# Patient Record
Sex: FEMALE | Race: BLACK OR AFRICAN AMERICAN | URBAN - METROPOLITAN AREA
[De-identification: names, ages, dates, MRNs, and addresses within clinical notes are randomized per-mention and may not be internally consistent; named-entity substitution may affect disease eponyms.]

---

## 2018-06-18 ENCOUNTER — HOSPITAL ENCOUNTER (OUTPATIENT)
Dept: MEDSURG UNIT | Facility: HOSPITAL | Age: 18
End: 2018-06-19
Attending: INTERNAL MEDICINE | Admitting: EMERGENCY MEDICINE

## 2018-06-24 LAB
FINAL CULTURE: NORMAL
FINAL CULTURE: NORMAL

## 2022-04-09 ENCOUNTER — HISTORICAL (OUTPATIENT)
Dept: ADMINISTRATIVE | Facility: HOSPITAL | Age: 22
End: 2022-04-09

## 2022-04-25 VITALS
SYSTOLIC BLOOD PRESSURE: 109 MMHG | BODY MASS INDEX: 40.62 KG/M2 | WEIGHT: 243.81 LBS | DIASTOLIC BLOOD PRESSURE: 73 MMHG | HEIGHT: 65 IN

## 2022-04-30 NOTE — DISCHARGE SUMMARY
"   Patient:   Dacia Worley            MRN: [id number]            FIN: [id number]               Age:   18 years     Sex:  Female     :  2000   Associated Diagnoses:   None   Author:   Sean Calderon MD        Discharge summary:   19 yo AAF with no significant PMH presented to the ED with the complain of right flank pain since 2 days. Pt describes the pain as "someone poking  her in the back". DEscribes it as constant, moderate in intensity, no rdaiation, no aggravating and relieving factors. Pt states that she did take tylenol at home but it did not help her. Pt also reports nausea but has not been vomiting however states that sh did not eat anything today because  of the nausea. Pt states that yesterday she started o run fever however she did not measure the temperature at home. Pt denies any urinary symptoms including burning, change  in smell, colour or blood in urine, incresaed frequency or urgency. Pt denies any vaginal discharge. Reports she is sexually active currently with one male partner, they donot use precaution. Denies any discharge or symptoms in the partner. Pt reports that she was also diagnosed with UTI 4 months ago and took abx for 7 days, she does not recall the name of the abx. Pt denies chills, night sweats, chest pain, headache, shortness of breath, chang anywhee else in the bosy. On arrival to the ER, vitals were temp 39.2, , RR 18, satting 99% on RA, /81. Labs revealed a WBC count of 11.3, urinalysis revealed UTI (however a bad catch), GC neg. Pt was given 1 dose of Rocephin in the ER and 3.5 litres of NS in the ER. Med on call was consulted for admission. Of not urine culture from 2018 grew E coli.    Today: Patient examined at bedside and NAD. She states she is feeling better then when she came in. The pain in her right flank is still there but has lessened. Her CT read showing right sided pyelonephritis. Patient denies any other complaints overnight, " no chest pain, no SOB, no vomiting. She expressed some mild nausea but otherwise she states she is in good health.     General_appears own age, in no acute distress  Integumentary_ normal capillary refill, normal texture/turgor/moisture  Eye_PERRLA, no icterus, no conjunctival injection  HENT_normocephalic, atraumatic, moist mucous membranes, no erythema, no exudate  Respiratory_clear to auscultation, no wheezes  Cardiovascular_RRR, no murmurs/rubs/gallops, normal distal pulses  Gastrointestinal_normal bowel sounds, abdomen soft/non-tender, no distension, +mild CVA tenderness on right improved from admission  Musculoskeletal_normal ROM, bilateral strength equal     Date of admission:  6/18/18    Date of discharge:  6/19/18    Presenting complaint: Right flank pain, fever, chills    Positive physical findings: Right CVA tenderness    Major test results:   CT abd/pelvis: Right sided pyelonephritis  Labs Last 48 hours   Chemistry  Hematology/Coagulation    Sodium Lvl: 142 mmol/L (06/19/18 07:25:00 CDT) WBC: 8.7 x10(3)/mcL (06/19/18 05:08:00 CDT)   Sodium Lvl: 136 mmol/L (06/18/18 16:04:00 CDT) WBC: 11.3 x10(3)/mcL High (06/18/18 16:04:00 CDT)   Potassium Lvl: 3.4 mmol/L Low (06/19/18 07:25:00 CDT) RBC: 3.85 x10(6)/mcL Low (06/19/18 05:08:00 CDT)   Potassium Lvl: 3.5 mmol/L (06/18/18 16:04:00 CDT) RBC: 4.68 x10(6)/mcL (06/18/18 16:04:00 CDT)   Chloride: 111 mmol/L High (06/19/18 07:25:00 CDT) Hgb: 10.6 gm/dL Low (06/19/18 05:08:00 CDT)   Chloride: 104 mmol/L (06/18/18 16:04:00 CDT) Hgb: 13 gm/dL (06/18/18 16:04:00 CDT)   CO2: 23 mmol/L (06/19/18 07:25:00 CDT) Hct: 33.8 % Low (06/19/18 05:08:00 CDT)   CO2: 23 mmol/L (06/18/18 16:04:00 CDT) Hct: 39.2 % (06/18/18 16:04:00 CDT)   Calcium Lvl: 7.7 mg/dL Low (06/19/18 07:25:00 CDT) Platelet: 177 x10(3)/mcL (06/19/18 05:08:00 CDT)   Calcium Lvl: 9.1 mg/dL (06/18/18 16:04:00 CDT) Platelet: 235 x10(3)/mcL (06/18/18 16:04:00 CDT)   Glucose Lvl: 88 mg/dL (06/19/18 07:25:00 CDT)  MCV: 87.8 fL (06/19/18 05:08:00 CDT)   Glucose Lvl: 91 mg/dL (06/18/18 16:04:00 CDT) MCV: 83.8 fL (06/18/18 16:04:00 CDT)   BUN: 7 mg/dL (06/19/18 07:25:00 CDT) MCH: 27.5 pg (06/19/18 05:08:00 CDT)   BUN: 10 mg/dL (06/18/18 16:04:00 CDT) MCH: 27.8 pg (06/18/18 16:04:00 CDT)   Creatinine: 0.7 mg/dL (06/19/18 07:25:00 CDT) MCHC: 31.4 gm/dL (06/19/18 05:08:00 CDT)   Creatinine: 0.9 mg/dL (06/18/18 16:04:00 CDT) MCHC: 33.2 gm/dL (06/18/18 16:04:00 CDT)   eGFR-AA: >105 (06/19/18 07:25:00 CDT) RDW: 13.4 % (06/19/18 05:08:00 CDT)   eGFR-AA: 103 mL/min (06/18/18 16:04:00 CDT) RDW: 13.3 % (06/18/18 16:04:00 CDT)   eGFR-MYRNA: >105 (06/19/18 07:25:00 CDT) MPV: 10.7 fL High (06/19/18 05:08:00 CDT)   eGFR-MYRNA: 85 mL/min Low (06/18/18 16:04:00 CDT) MPV: 9.6 fL (06/18/18 16:04:00 CDT)   Bili Total: 2.3 mg/dL High (06/19/18 07:25:00 CDT) Abs Neut: 5.64 x10(3)/mcL (06/19/18 05:08:00 CDT)   Bili Total: 2.6 mg/dL High (06/18/18 16:04:00 CDT) Abs Neut: 9.06 x10(3)/mcL (06/18/18 16:04:00 CDT)   Bili Direct: 0.7 mg/dL High (06/19/18 07:25:00 CDT) Neutro Auto: 65 x10(3)/mcL (06/19/18 05:08:00 CDT)   Bili Direct: 0.6 mg/dL High (06/18/18 16:04:00 CDT) Neutro Auto: 80 x10(3)/mcL (06/18/18 16:04:00 CDT)   Bili Indirect: 1.6 mg/dL High (06/19/18 07:25:00 CDT) Lymph Auto: 18 % (06/19/18 05:08:00 CDT)   Bili Indirect: 2 mg/dL High (06/18/18 16:04:00 CDT) Lymph Auto: 6 % Low (06/18/18 16:04:00 CDT)   AST: 17 unit/L (06/19/18 07:25:00 CDT) Mono Auto: 16 % High (06/19/18 05:08:00 CDT)   AST: 18 unit/L (06/18/18 16:04:00 CDT) Mono Auto: 13 % High (06/18/18 16:04:00 CDT)   ALT: 22 unit/L (06/19/18 07:25:00 CDT) Eos Auto: 0 (06/19/18 05:08:00 CDT)   ALT: 27 unit/L (06/18/18 16:04:00 CDT) Eos Auto: 0 (06/18/18 16:04:00 CDT)   Alk Phos: 69 unit/L (06/19/18 07:25:00 CDT) Abs Eos: 0.03 x10(3)/mcL (06/19/18 05:08:00 CDT)   Alk Phos: 79 unit/L (06/18/18 16:04:00 CDT) Abs Eos: 0.01 x10(3)/mcL (06/18/18 16:04:00 CDT)   Total Protein: 6.8 gm/dL (06/19/18  07:25:00 CDT) Basophil Auto: 0 (06/19/18 05:08:00 CDT)   Total Protein: 8.2 gm/dL (06/18/18 16:04:00 CDT) Basophil Auto: 0 (06/18/18 16:04:00 CDT)   Albumin Lvl: 3.1 gm/dL Low (06/19/18 07:25:00 CDT) Abs Neutro: 5.64 x10(3)/mcL (06/19/18 05:08:00 CDT)   Albumin Lvl: 4 gm/dL (06/18/18 16:04:00 CDT) Abs Neutro: 9.06 x10(3)/mcL (06/18/18 16:04:00 CDT)   Globulin: 3.7 gm/mL High (06/19/18 07:25:00 CDT) Abs Lymph: 1.57 x10(3)/mcL (06/19/18 05:08:00 CDT)   Globulin: 4.2 gm/mL High (06/18/18 16:04:00 CDT) Abs Lymph: 0.7 x10(3)/mcL (06/18/18 16:04:00 CDT)   A/G Ratio: 1 ratio (06/19/18 07:25:00 CDT) Abs Mono: 1.37 x10(3)/mcL (06/19/18 05:08:00 CDT)   A/G Ratio: 1 ratio (06/18/18 16:04:00 CDT) Abs Mono: 1.5 x10(3)/mcL (06/18/18 16:04:00 CDT)   Lactic Acid Lvl: 1.3 mmol/L (06/18/18 22:05:00 CDT) Abs Baso: 0.02 x10(3)/mcL (06/19/18 05:08:00 CDT)   U pH: 6 (06/18/18 15:32:00 CDT) Abs Baso: 0.04 x10(3)/mcL (06/18/18 16:04:00 CDT)    IG%: 0 % (06/19/18 05:08:00 CDT)    IG%: 0 % (06/18/18 16:04:00 CDT)    IG#: 0.03 (06/19/18 05:08:00 CDT)    IG#: 0.02 (06/18/18 16:04:00 CDT)         Principal diagnosis: Pyelonephritis    Allergies: None    Consultants: None    Discharge condition: Stable    Discharge medications: Cipro 500mg BID for 10 days    Discharge instructions:    Activity: As tolerated   Diet: Regular    Follow-up plan: Follow up with PCP within 2 weeks    Place patient discharged to: Home

## 2022-04-30 NOTE — ED PROVIDER NOTES
Patient:   Dacia Worley            MRN: [id number]            FIN: [id number]               Age:   18 years     Sex:  Female     :  2000   Associated Diagnoses:   Acute pyelonephritis   Author:   Carlito Hanna MD      Basic Information   Time seen: Date & time 2018 15:48:00.   History source: Patient.   Arrival mode: Private vehicle.   History limitation: None.   Additional information: Chief Complaint from Nursing Triage Note : Chief Complaint   2018 15:28 CDT      Chief Complaint           8/10 R flank pain and nausea x 2 days. fever since yesterday. denies urinary issues. T 39.2 tachy 130's in triage.  (Modified) .      History of Present Illness   The patient presents with flank pain.  The onset was 2  days ago.  The course/duration of symptoms is worsening.  The character of symptoms is sharp.  The degree at onset was 6 /10.  The Location of pain at onset was right and flank.  The degree at present is 8 /10.  The Location of pain at present is flank.  Radiating pain: none. The exacerbating factor is none.  The relieving factor is rest.  Therapy today: none.  Risk factors consist of none.  Associated symptoms: nausea, fever, chills, denies chest pain, denies vomiting, denies diarrhea, denies back pain, denies shortness of breath, denies headache and denies dizziness.  Additional history: sexual history: non-contributory.     The patient presents with fever.  The onset was 6  hours ago.  The course/duration of symptoms is constant.  Associated symptoms: nausea and flank pain.  Temperature is 39.2  Celsius.  Prior episodes: none.  Additional history: 17 y/o F with 2 days of right sided flank pain and fever x 6 days.  associated nausea, no dysuria, no urgency, no frequency, no abdominal pain, nausea, present no vomiting.  no cough, no diarrhea, no constipation, no sick contacts, no cough.        Review of Systems   Constitutional symptoms:  Fever, chills.    Skin symptoms:  No  rash,    Eye symptoms:  No pain, no discharge.    ENMT symptoms:  No sore throat, no nasal congestion.    Respiratory symptoms:  No shortness of breath, no cough.    Cardiovascular symptoms:  No chest pain, no palpitations, no tachycardia.    Gastrointestinal symptoms:  Negative except as documented in HPI.   Genitourinary symptoms:  No dysuria, no hematuria, no vaginal bleeding.    Musculoskeletal symptoms:  No Muscle pain,    Neurologic symptoms:  No headache, no dizziness.    Endocrine symptoms:  No polyuria, no polydipsia.              Additional review of systems information: All other systems reviewed and otherwise negative.      Health Status   Allergies:    Allergies (1) Active Reaction  No Known Medication Allergies None Documented  .   Medications: None.   Immunizations: Up to date.   Menstrual history: Per nurse's notes.      Past Medical/ Family/ Social History   Medical history: Negative.   Surgical history: Negative.   Family history: Not significant.   Social history:    Social & Psychosocial Habits    Alcohol  11/24/2017  Use: Current    Substance Abuse  11/24/2017  Use: Never    Tobacco  11/24/2017  Use: Never smoker  .      Physical Examination               Vital Signs   Vital Signs   6/18/2018 15:28 CDT      Temperature Oral          39.2 DegC  HI                             Temperature Oral (calculated)             102.56 DegF                             Peripheral Pulse Rate     133 bpm  HI                             Respiratory Rate          18 br/min                             SpO2                      99 %                             Oxygen Therapy            Room air                             Systolic Blood Pressure   127 mmHg                             Diastolic Blood Pressure  81 mmHg  .   General:  Alert, no acute distress.    Skin:  Warm, dry.    Head:  Normocephalic.   Neck:  Supple.   Eye:  Pupils are equal, round and reactive to light, extraocular movements are intact.    Ears,  nose, mouth and throat:  No pharyngeal erythema or exudate.   Cardiovascular:  Regular rate and rhythm, No murmur, Normal peripheral perfusion.    Respiratory:  Lungs are clear to auscultation, respirations are non-labored, breath sounds are equal, Symmetrical chest wall expansion.    Chest wall:  No tenderness.   Back:  Normal range of motion.   Musculoskeletal:  Normal ROM, no deformity.    Gastrointestinal:  Soft, Nontender, Non distended, Normal bowel sounds, No organomegaly.    Genitourinary:  right CVA tenderness.   Neurological:  Alert and oriented to person, place, time, and situation, No focal neurological deficit observed, CN II-XII intact, normal sensory observed, normal motor observed, normal speech observed, normal coordination observed.    Lymphatics:  No lymphadenopathy.      Medical Decision Making   Differential Diagnosis:  Urinary tract infection, pyelonephritis.    Differential Diagnosis:  Urinary tract infection, pyelonephritis.    Results review:  Lab results : Lab View   6/18/2018 16:37 CDT      U beta hCG Ql POC         Negative    6/18/2018 16:04 CDT      Sodium Lvl                136 mmol/L                             Potassium Lvl             3.5 mmol/L                             Chloride                  104 mmol/L                             CO2                       23 mmol/L                             Calcium Lvl               9.1 mg/dL                             Glucose Lvl               91 mg/dL                             BUN                       10 mg/dL                             Creatinine                0.90 mg/dL                             eGFR-AA                   103 mL/min                             eGFR-MYRNA                  85 mL/min  LOW                             Bili Total                2.6 mg/dL  HI                             Bili Direct               0.6 mg/dL  HI                             Bili Indirect             2.0 mg/dL  HI                              AST                       18 unit/L                             ALT                       27 unit/L                             Alk Phos                  79 unit/L                             Total Protein             8.2 gm/dL                             Albumin Lvl               4.0 gm/dL                             Globulin                  4.20 gm/mL  HI                             A/G Ratio                 1 ratio                             WBC                       11.3 x10(3)/mcL  HI                             RBC                       4.68 x10(6)/mcL                             Hgb                       13.0 gm/dL                             Hct                       39.2 %                             Platelet                  235 x10(3)/mcL                             MCV                       83.8 fL                             MCH                       27.8 pg                             MCHC                      33.2 gm/dL                             RDW                       13.3 %                             MPV                       9.6 fL                             Abs Neut                  9.06 x10(3)/mcL                             Neutro Auto               80 x10(3)/mcL  NA                             Lymph Auto                6 %  LOW                             Mono Auto                 13 %  HI                             Eos Auto                  0  NA                             Abs Eos                   0.01 x10(3)/mcL  NA                             Basophil Auto             0  NA                             Abs Neutro                9.06 x10(3)/mcL  NA                             Abs Lymph                 0.70 x10(3)/mcL  NA                             Abs Mono                  1.50 x10(3)/mcL  NA                             Abs Baso                  0.04 x10(3)/mcL  NA                             IG%                       0 %  NA                             IG#                        0.0200  NA    6/18/2018 15:32 CDT      UA Appear                 Hazy                             UA Color                  YELLOW                             UA Spec Grav              1.016                             UA Bili                   Negative                             UA pH                     6.0                             UA Urobilinogen           Normal                             UA Blood                  0.06 mg/dL                             UA Glucose                Normal                             UA Ketones                20 mg/dL                             UA Protein                50 mg/dL                             UA Nitrite                2+                             UA Leuk Est               250 Jolanta/uL                             UA WBC Interp             51-99 /HPF                             UA RBC Interp             0-2                             UA Bact Interp            Many                             UA Squam Epi Interp       >100                             UA Hyal Cast Interp       0-2  .      Reexamination/ Reevaluation   Time: 6/18/2018 18:45:00 .   Vital signs   results included from flowsheet : Vital Signs   6/18/2018 18:42 CDT      Temperature Oral          37.2 DegC                             Temperature Oral (calculated)             98.96 DegF    6/18/2018 18:33 CDT      Peripheral Pulse Rate     105 bpm  HI                             SpO2                      100 %    6/18/2018 18:00 CDT      Temperature Oral          37.3 DegC                             Temperature Oral (calculated)             99.14 DegF                             Peripheral Pulse Rate     109 bpm  HI                             SpO2                      100 %                             Systolic Blood Pressure   110 mmHg                             Diastolic Blood Pressure  73 mmHg                             Mean Arterial Pressure, Cuff              85 mmHg    6/18/2018 17:20 CDT       Temperature Oral          37.7 DegC                             Temperature Oral (calculated)             99.86 DegF                             Peripheral Pulse Rate     111 bpm  HI                             Respiratory Rate          16 br/min                             SpO2                      99 %                             Oxygen Therapy            Room air                             Systolic Blood Pressure   117 mmHg                             Diastolic Blood Pressure  72 mmHg                             Mean Arterial Pressure, Cuff              87 mmHg    6/18/2018 16:50 CDT      Temperature Oral          38.0 DegC                             Temperature Oral (calculated)             100.40 DegF                             Peripheral Pulse Rate     116 bpm  HI                             Respiratory Rate          16 br/min                             SpO2                      98 %                             Oxygen Therapy            Room air                             Systolic Blood Pressure   111 mmHg                             Diastolic Blood Pressure  70 mmHg                             Mean Arterial Pressure, Cuff              84 mmHg    6/18/2018 15:28 CDT      Temperature Oral          39.2 DegC  HI                             Temperature Oral (calculated)             102.56 DegF                             Peripheral Pulse Rate     133 bpm  HI                             Respiratory Rate          18 br/min                             SpO2                      99 %                             Oxygen Therapy            Room air                             Systolic Blood Pressure   127 mmHg                             Diastolic Blood Pressure  81 mmHg     Course: improving.   Pain status: unchanged.      Procedure   Biliary ultrasound   Procedure notes:   no stone visualized, 3mm common bile duct diameter, no pernephric fluid negative murpheys      Impression and Plan   Diagnosis   Acute  pyelonephritis ([id number] N10)   Diagnosis   Acute pyelonephritis ([id number] N10)   Plan   Condition: Improved, Stable.    Disposition: Medically cleared, Discharged: Time  6/18/2018 18:46:00, to home, Dispositioned by: Time: 6/18/2018 18:46:00, Jose Wood MD    Prescriptions: [facility] prescriptions   Pharmacy:  Cipro 500 mg oral tablet (Prescribe): 500 mg = 1 tab(s), Oral, q12hr, X 10 day(s), # 20 tab(s), 0 Refill(s).    Patient was given the following educational materials: Pyelonephritis, Adult, Pyelonephritis, Adult.    Follow up with: ; Follow up with PCP in 1 week.  Return to the ED if symptoms worsen or return.    Counseled: Patient, Regarding diagnosis, Regarding diagnostic results, Regarding treatment plan, Regarding prescription, Patient indicated understanding of instructions.    Orders: [facility] Orders   Admit/Transfer/Discharge:  Discharge (Order): Home.    Notes: Discussed with Dr Wood, Tolerating PO.       Addendum      Teaching-Supervisory Addendum-Brief   I participated in the following activities of this patients care: the medical history, the physical exam, medical decision making.   I personally performed: supervision of the patient's care, the medical decision making.   The case was discussed with: the resident.   Evaluation and management service: I agree with the evaluation and management decisions made in this patient's care.   Results interpretation: I agree with the documentation of the study interpretation.   Notes: I, Dr. Hanna, participated in the exam and care of this patient and agree with the documented noted above with the included changes..

## 2022-04-30 NOTE — H&P
"   Patient:   Dacia Worley            MRN: [id number]            FIN: [id number]               Age:   18 years     Sex:  Female     :  2000   Associated Diagnoses:   None   Author:   Tanya Sky MD      Chief Complaint   right flank pain, nausea since 2 days, fever since 1 day      History of Present Illness   17 yo AAF with no significant PMH presented to the ED with the complain of right flank pain since 2 days. Pt describes the pain as "someone poking  her in the back". DEscribes it as constant, moderate in intensity, no rdaiation, no aggravating and relieving factors. Pt states that she did take tylenol at home but it did not help her. Pt also reports nausea but has not been vomiting however states that sh did not eat anything today because  of the nausea. Pt states that yesterday she started o run fever however she did not measure the temperature at home. Pt denies any urinary symptoms including burning, change  in smell, colour or blood in urine, incresaed frequency or urgency. Pt denies any vaginal discharge. Reports she is sexually active currently with one male partner, they donot use precaution. Denies any discharge or symptoms in the partner. Pt reports that she was also diagnosed with UTI 4 months ago and took abx for 7 days, she does not recall the name of the abx. Pt denies chills, night sweats, chest pain, headache, shortness of breath, chang anywhee else in the bosy. On arrival to the ER, vitals were temp 39.2, , RR 18, satting 99% on RA, /81. Labs revealed a WBC count of 11.3, urinalysis revealed UTI (however a bad catch), GC neg. Pt was given 1 dose of Rocephin in the ER and 3.5 litres of NS in the ER. Med on call was consulted for admission. Of not urine culture from 2018 grew E coli.    PMH: denies    PSH: denies    FH: non contributary    Allergies: none     SH: denies smoking, alcohol, oral or IV drug use    Mensrual history: LMP a month ago. Regular " cycles, normal flow      Review of Systems   Constitutional:  Fever, No chills, No sweats, No weakness.    Eye:  No icterus, No blurring.    Ear/Nose/Mouth/Throat:  No nasal congestion, No sore throat.    Respiratory:  No shortness of breath, No cough, No sputum production.    Cardiovascular:  No chest pain, No palpitations, No peripheral edema, No syncope.    Gastrointestinal:  Nausea, right flank pain, No vomiting, No diarrhea, No constipation, No heartburn.    Genitourinary:  No dysuria, No hematuria.    Immunologic:  Not immunocompromised.    Musculoskeletal:  No neck pain, No joint pain.    Integumentary:  No rash, No pruritus.    Neurologic:  Alert and oriented X4, No abnormal balance, No confusion.    Psychiatric:  No anxiety, No depression.       Physical Examination      Vital Signs (last 24 hrs)_____  Last Charted___________  Temp Oral     37.2 DegC  (JUN 18 19:36)  Heart Rate Peripheral   H 122bpm  (JUN 18 21:12)  Resp Rate         16 br/min  (JUN 18 17:20)  SBP      126 mmHg  (JUN 18 21:12)  DBP      81 mmHg  (JUN 18 21:12)  SpO2      100 %  (JUN 18 18:33)  Weight      110.2 kg  (JUN 18 15:28)  Height      165 cm  (JUN 18 15:28)  BMI      40.48  (SARITHA 18 15:28)     General:  Alert and oriented, No acute distress.    Eye:  Pupils are equal, round and reactive to light, Extraocular movements are intact.    HENT:  Normocephalic.    Neck:  Supple, Non-tender, No carotid bruit, No jugular venous distention.    Respiratory:  Lungs are clear to auscultation, Respirations are non-labored.    Cardiovascular:  Regular rhythm, No murmur, tachycardia.    Gastrointestinal:  Soft, Non-tender, Non-distended.    Genitourinary:  right CVA tenderness.    Lymphatics:  No lymphadenopathy neck, axilla, groin.    Musculoskeletal:  Normal range of motion, Normal strength.    Integumentary:  Warm, Dry.    Neurologic:  Alert, Oriented, Normal sensory, Normal motor function, No focal deficits, Cranial Nerves II-XII are grossly  intact.    Cognition and Speech:  Oriented, Speech clear and coherent.    Psychiatric:  Cooperative, Appropriate mood & affect.       Review / Management   Results review:     Labs (Last four charted values)  WBC                  H 11.3 (JUN 18)   Hgb                  13.0 (JUN 18)   Hct                  39.2 (JUN 18)   Plt                  235 (JUN 18)   Na                   136 (JUN 18)   K                    3.5 (JUN 18)   CO2                  23 (JUN 18)   Cl                   104 (JUN 18)   Cr                   0.90 (JUN 18)   BUN                  10 (JUN 18)   Glucose Random       91 (JUN 18) .    Laboratory Results   Today's Lab Results : [facility] Discrete Results   6/18/2018 16:04 CDT      WBC                       11.3 x10(3)/mcL  HI                             RBC                       4.68 x10(6)/mcL                             Hgb                       13.0 gm/dL                             Hct                       39.2 %                             Platelet                  235 x10(3)/mcL                             MCV                       83.8 fL                             MCH                       27.8 pg                             MCHC                      33.2 gm/dL                             RDW                       13.3 %                             MPV                       9.6 fL                             Abs Neut                  9.06 x10(3)/mcL                             Neutro Auto               80 x10(3)/mcL  NA                             Lymph Auto                6 %  LOW                             Mono Auto                 13 %  HI                             Eos Auto                  0  NA                             Abs Eos                   0.01 x10(3)/mcL  NA                             Basophil Auto             0  NA                             Abs Neutro                9.06 x10(3)/mcL  NA                             Abs Lymph                 0.70 x10(3)/mcL  NA                              Abs Mono                  1.50 x10(3)/mcL  NA                             Abs Baso                  0.04 x10(3)/mcL  NA                             IG%                       0 %  NA                             IG#                       0.0200  NA                             Sodium Lvl                136 mmol/L                             Potassium Lvl             3.5 mmol/L                             Chloride                  104 mmol/L                             CO2                       23 mmol/L                             Calcium Lvl               9.1 mg/dL                             Glucose Lvl               91 mg/dL                             BUN                       10 mg/dL                             Creatinine                0.90 mg/dL                             eGFR-AA                   103 mL/min                             eGFR-MYRNA                  85 mL/min  LOW                             Bili Total                2.6 mg/dL  HI                             Bili Direct               0.6 mg/dL  HI                             Bili Indirect             2.0 mg/dL  HI                             AST                       18 unit/L                             ALT                       27 unit/L                             Alk Phos                  79 unit/L                             Total Protein             8.2 gm/dL                             Albumin Lvl               4.0 gm/dL                             Globulin                  4.20 gm/mL  HI                             A/G Ratio                 1 ratio    6/18/2018 15:32 CDT      UA Appear                 Hazy                             UA Color                  YELLOW                             UA Spec Grav              1.016                             UA Bili                   Negative                             UA pH                     6.0                             UA Urobilinogen           Normal                              UA Blood                  0.06 mg/dL                             UA Glucose                Normal                             UA Ketones                20 mg/dL                             UA Protein                50 mg/dL                             UA Nitrite                2+                             UA Leuk Est               250 Jolanta/uL                             UA WBC Interp             51-99 /HPF                             UA RBC Interp             0-2                             UA Bact Interp            Many                             UA Squam Epi Interp       >100                             UA Hyal Cast Interp       0-2                             Chlam trach PCR           NOT DETECTED                             N. gonorrhea PCR          NOT DETECTED           Impression and Plan   1. Pyelonephritis      fever, right flank pain, UA consitent with UTI (however a bad catch), WBC 11.3      CT abd pelvis pending      Pt was given 1 dose of Rocephin, 1 dose of tylenol in the ED and 3.5 L of fluids      Will continue Rocephin       cont IV hydration with  NS at 100ml/hr       CT  abd pelvis spending       Blood cx, urine cx ordered in the Ed -pending       UDS pending       prn zofran for nauea, pt wantsto eat , will start on diet       prn acetaminophen for fever, pain       Lactic acid ordered by Ed-pending    2. DVT ppx-lovenox    Dispo: Pt admitted to medical unit. Cont abx, F/U on cx. Cont hydration. F/u on CT abd pelvis.